# Patient Record
Sex: FEMALE | Race: WHITE | NOT HISPANIC OR LATINO | Employment: FULL TIME | URBAN - METROPOLITAN AREA
[De-identification: names, ages, dates, MRNs, and addresses within clinical notes are randomized per-mention and may not be internally consistent; named-entity substitution may affect disease eponyms.]

---

## 2017-07-22 ENCOUNTER — HOSPITAL ENCOUNTER (EMERGENCY)
Facility: HOSPITAL | Age: 22
Discharge: HOME | End: 2017-07-23
Attending: EMERGENCY MEDICINE
Payer: COMMERCIAL

## 2017-07-22 VITALS
DIASTOLIC BLOOD PRESSURE: 84 MMHG | WEIGHT: 160 LBS | HEIGHT: 62 IN | OXYGEN SATURATION: 99 % | BODY MASS INDEX: 29.44 KG/M2 | SYSTOLIC BLOOD PRESSURE: 154 MMHG | RESPIRATION RATE: 16 BRPM | TEMPERATURE: 98.1 F | HEART RATE: 93 BPM

## 2017-07-22 DIAGNOSIS — J06.9 VIRAL URI: Primary | ICD-10-CM

## 2017-07-23 PROCEDURE — 99282 EMERGENCY DEPT VISIT SF MDM: CPT

## 2017-07-23 RX ORDER — NAPROXEN 250 MG/1
500 TABLET ORAL ONCE
Status: COMPLETED | OUTPATIENT
Start: 2017-07-23 | End: 2017-07-23

## 2017-07-23 RX ORDER — OXYMETAZOLINE HYDROCHLORIDE 0.05 G/100ML
2 SPRAY NASAL ONCE
Status: COMPLETED | OUTPATIENT
Start: 2017-07-23 | End: 2017-07-23

## 2017-07-23 RX ORDER — NAPROXEN 375 MG/1
375 TABLET ORAL 2 TIMES DAILY WITH MEALS
Qty: 20 TABLET | Refills: 0 | Status: SHIPPED | OUTPATIENT
Start: 2017-07-23 | End: 2018-12-29 | Stop reason: ALTCHOICE

## 2017-07-23 RX ADMIN — NAPROXEN 500 MG: 250 TABLET ORAL at 00:33

## 2017-07-23 RX ADMIN — OXYMETAZOLINE HYDROCHLORIDE 2 SPRAY: 5 SPRAY NASAL at 00:34

## 2017-07-23 RX ADMIN — DEXAMETHASONE SODIUM PHOSPHATE 10 MG: 10 INJECTION, SOLUTION INTRAMUSCULAR; INTRAVENOUS at 00:33

## 2018-12-29 ENCOUNTER — OFFICE VISIT (OUTPATIENT)
Dept: URGENT CARE | Facility: CLINIC | Age: 23
End: 2018-12-29

## 2018-12-29 VITALS
WEIGHT: 173 LBS | RESPIRATION RATE: 16 BRPM | DIASTOLIC BLOOD PRESSURE: 88 MMHG | HEART RATE: 92 BPM | BODY MASS INDEX: 31.64 KG/M2 | OXYGEN SATURATION: 100 % | SYSTOLIC BLOOD PRESSURE: 148 MMHG | TEMPERATURE: 100.7 F

## 2018-12-29 DIAGNOSIS — J06.9 VIRAL URI WITH COUGH: Primary | ICD-10-CM

## 2018-12-29 PROCEDURE — 99213 OFFICE O/P EST LOW 20 MIN: CPT | Performed by: NURSE PRACTITIONER

## 2018-12-29 RX ORDER — DEXTROMETHORPHAN HYDROBROMIDE AND PROMETHAZINE HYDROCHLORIDE 15; 6.25 MG/5ML; MG/5ML
5 SYRUP ORAL 4 TIMES DAILY PRN
Qty: 180 ML | Refills: 0 | Status: SHIPPED | OUTPATIENT
Start: 2018-12-29

## 2018-12-29 RX ORDER — FLUTICASONE PROPIONATE 50 MCG
1 SPRAY, SUSPENSION (ML) NASAL DAILY
Qty: 16 G | Refills: 0 | Status: SHIPPED | OUTPATIENT
Start: 2018-12-29

## 2018-12-29 NOTE — PROGRESS NOTES
[facility]        NAME: Jv Wiley is a 26 y o  female  : 1995    MRN: [id number]  DATE: 2018  TIME: 1:32 PM    Assessment and Plan     1  Viral URI with cough  Symptomatic tx  - promethazine-dextromethorphan (PHENERGAN-DM) 6 25-15 mg/5 mL oral syrup; Take 5 mL by mouth 4 (four) times a day as needed for cough  Dispense: 180 mL; Refill: 0  - fluticasone (FLONASE) 50 mcg/act nasal spray; 1 spray into each nostril daily  Dispense: 16 g; Refill: 0        Patient Instructions   Fluids  Rest  Nasal saline  Supportive care for symptom management  Tylenol or ibuprofen as needed for fever or discomfort  Use a cool mist humidifier at bedtime  Flonase as directed  Promethazine DM as needed for cough  Antibiotics are not indicated at this time  Symptoms may persist for 7-14 days  Chief Complaint     Chief Complaint   Patient presents with    Cold Like Symptoms     sore throat, cough, chest congestion/pressure;right ear popping, started 2days         History of Present Illness       Symptoms 2 days  Sore throat  Hurts to swallow  Dry cough  Chest feels heavy  Denies nasal congestion  Denies pnd  Right ear popping  No history of asthma  Non smoker  Took nyquil, dayquil, cough drops  Review of Systems   Review of Systems   Constitutional: Positive for fever (low grade)  Negative for fatigue  HENT: Positive for ear pain (right ear popping), sore throat and trouble swallowing  Negative for congestion, postnasal drip, rhinorrhea, sinus pain and sinus pressure  Respiratory: Positive for cough  Negative for shortness of breath  Gastrointestinal: Negative for diarrhea, nausea and vomiting  Musculoskeletal: Negative for myalgias  Skin: Negative for rash  Neurological: Negative for dizziness and headaches  Hematological: Negative for adenopathy           Current Medications       Current Outpatient Prescriptions:     norgestrel-ethinyl estradiol (LO/OVRAL) 0 3 mg-30 mcg per tablet, Take 1 tablet by mouth daily, Disp: , Rfl:     norgestimate-ethinyl estradiol (ORTHO-CYCLEN) 0 25-35 MG-MCG per tablet, Take 1 tablet by mouth daily  , Disp: , Rfl:     Current Allergies     Allergies as of 12/29/2018 - Reviewed 12/29/2018   Allergen Reaction Noted    Penicillins  04/17/2016            The following portions of the patient's history were reviewed and updated as appropriate: allergies, current medications, past family history, past medical history, past social history, past surgical history and problem list      Past Medical History:   Diagnosis Date    Patient denies medical problems        Past Surgical History:   Procedure Laterality Date    NO PAST SURGERIES         Family History   Problem Relation Age of Onset    No Known Problems Mother     No Known Problems Father          Medications have been verified  Objective   /88   Pulse 92   Temp (!) 100 7 °F (38 2 °C)   Resp 16   Wt 78 5 kg (173 lb)   LMP 11/27/2018   SpO2 100%   BMI 31 64 kg/m²        Physical Exam     Physical Exam   Constitutional: She appears well-developed and well-nourished  No distress  HENT:   TMS WNL  Turbinates inflamed  Oropharynx with no erythema or exudate  No sinus tenderness to palpation    (+) PND     Cardiovascular: Normal rate and regular rhythm  Pulmonary/Chest: Effort normal and breath sounds normal  No respiratory distress  She has no wheezes  Lymphadenopathy:     She has no cervical adenopathy  Skin: Skin is warm and dry  Vitals reviewed

## 2018-12-29 NOTE — PATIENT INSTRUCTIONS
Fluids  Rest  Nasal saline  Supportive care for symptom management  Tylenol or ibuprofen as needed for fever or discomfort  Use a cool mist humidifier at bedtime  Flonase as directed  Promethazine DM as needed for cough  Antibiotics are not indicated at this time  Symptoms may persist for 7-14 days